# Patient Record
Sex: MALE | Race: WHITE | URBAN - METROPOLITAN AREA
[De-identification: names, ages, dates, MRNs, and addresses within clinical notes are randomized per-mention and may not be internally consistent; named-entity substitution may affect disease eponyms.]

---

## 2017-07-25 ENCOUNTER — TELEPHONE (OUTPATIENT)
Dept: INTERNAL MEDICINE CLINIC | Age: 32
End: 2017-07-25

## 2025-02-20 ENCOUNTER — OFFICE VISIT (OUTPATIENT)
Age: 40
End: 2025-02-20

## 2025-02-20 VITALS
WEIGHT: 238 LBS | BODY MASS INDEX: 35.25 KG/M2 | DIASTOLIC BLOOD PRESSURE: 82 MMHG | HEIGHT: 69 IN | OXYGEN SATURATION: 96 % | SYSTOLIC BLOOD PRESSURE: 130 MMHG | TEMPERATURE: 97.1 F | HEART RATE: 89 BPM | RESPIRATION RATE: 16 BRPM

## 2025-02-20 DIAGNOSIS — Z75.8 DOES NOT HAVE PRIMARY CARE PROVIDER: ICD-10-CM

## 2025-02-20 DIAGNOSIS — R03.0 ELEVATED BLOOD PRESSURE READING WITHOUT DIAGNOSIS OF HYPERTENSION: ICD-10-CM

## 2025-02-20 DIAGNOSIS — K52.9 GASTROENTERITIS: Primary | ICD-10-CM

## 2025-02-20 LAB
INFLUENZA A ANTIGEN, POC: NEGATIVE
INFLUENZA B ANTIGEN, POC: NEGATIVE

## 2025-02-20 RX ORDER — ONDANSETRON 4 MG/1
4 TABLET, ORALLY DISINTEGRATING ORAL
Qty: 10 TABLET | Refills: 0 | Status: SHIPPED | OUTPATIENT
Start: 2025-02-20

## 2025-02-20 NOTE — PATIENT INSTRUCTIONS
Keep hydrated, tylenol or ibuprofen (if no contraindications) as needed if pain or fever..  follow up in  3-5- days if not better  Return sooner or go to the ER if symptoms worse/feeling worse or has new symptoms or concerns   Hydration-increasing amount of volume over time discussed and then progression of diet discussed  Go to ER if : spikes fever, if return of vomiting or N/V continues despite medication  . if abdominal pain becomes worse-constant and focalized to one specific area of abdomen or back, or if notices black or bloody stools

## 2025-02-20 NOTE — PROGRESS NOTES
Cabrera Mcfarland (:  1985) is a 39 y.o. male,New patient, here for evaluation of the following chief complaint(s):  Nausea (Nausea, vomiting, and diarrhea x 2 days. Unable to keep food down. Fatigue. Denies any respiratory symptoms. )      ASSESSMENT/PLAN:    ICD-10-CM    1. Gastroenteritis  K52.9 POCT Influenza A/B Antigen     ondansetron (ZOFRAN-ODT) 4 MG disintegrating tablet      2. Does not have primary care provider  Z75.8 [facility]      3. Elevated blood pressure reading without diagnosis of hypertension  R03.0 [facility]        Results for orders placed or performed in visit on 25   POCT Influenza A/B Antigen   Result Value Ref Range    Inflenza A Ag negative     Influenza B Ag negative       Can have his PCP recheck BP once well      Keep hydrated, tylenol or ibuprofen (if no contraindications) as needed if pain or fever..  follow up in  3-5- days if not better  Return sooner or go to the ER if symptoms worse/feeling worse or has new symptoms or concerns   Hydration-increasing amount of volume over time discussed and then progression of diet discussed  Go to ER if : spikes fever, if return of vomiting or N/V continues despite medication  . if abdominal pain becomes worse-constant and focalized to one specific area of abdomen or back, or if notices black or bloody stools     SUBJECTIVE/OBJECTIVE:  Patient presents with:  Nausea: Nausea, vomiting, and diarrhea x 2 days. Unable to keep food down. Fatigue. Denies any respiratory symptoms.     No known exposures  No fever  No focal abdominal pain  No black or bloody stools         History provided by:  Patient      Vitals:    25 1201 25 1216   BP: (!) 144/85 130/82   Pulse: 89    Resp: 16    Temp: 97.1 °F (36.2 °C)    TempSrc: Infrared    SpO2: 96%    Weight: 108 kg (238 lb)    Height: 1.753 m (5' 9\")        Review of Systems   Constitutional:  Negative for chills and fever.

## 2025-02-22 ASSESSMENT — ENCOUNTER SYMPTOMS
SORE THROAT: 0
NAUSEA: 1
BLOOD IN STOOL: 0
ABDOMINAL PAIN: 1
VOMITING: 1
DIARRHEA: 1
COUGH: 0